# Patient Record
Sex: MALE | Race: ASIAN | NOT HISPANIC OR LATINO | URBAN - METROPOLITAN AREA
[De-identification: names, ages, dates, MRNs, and addresses within clinical notes are randomized per-mention and may not be internally consistent; named-entity substitution may affect disease eponyms.]

---

## 2022-03-25 RX ORDER — LOSARTAN POTASSIUM 100 MG/1
1 TABLET, FILM COATED ORAL
Qty: 0 | Refills: 0 | DISCHARGE

## 2022-03-25 RX ORDER — ERGOCALCIFEROL 1.25 MG/1
1 CAPSULE ORAL
Qty: 0 | Refills: 0 | DISCHARGE

## 2022-03-25 RX ORDER — CHLORHEXIDINE GLUCONATE 213 G/1000ML
1 SOLUTION TOPICAL ONCE
Refills: 0 | Status: DISCONTINUED | OUTPATIENT
Start: 2022-04-27 | End: 2022-05-11

## 2022-03-25 NOTE — H&P ADULT - HISTORY OF PRESENT ILLNESS
Cardiologist: Dr. Phyllis Moy  Escort:  Pharmacy: [facility] [phone]  COVID:    66 yo ___ speaking M, former smoker, with a PMH of HTN, HLD. Type II DM, AAA s/p EVAR who presented to outpatient cardiologist Dr. Phyllis Moy with complaints of a substernal, non-radiating intermittent chest pressure on exertion of 2 city blocks, relieved with rest over the past few weeks. Denies SOB, dizziness, diaphoresis, palpitations, orthopnea/PND, abdominal pain, N/V/D, urinary sx, BRBPR, hematuria, melena, LE swelling, recent travel/sick contacts/illness. NST 12/6/21: moderate perfusion abnormality of moderate intensity in the inferior region which was reversible with rest, LVEF 64%, normal wall motion. Echocardiogram 11/1/21: LVEF 60%, grade I DD, non significant valvular dz. In light of patient's risk factors, CCS angina equivalent class III sx and abnormal NST, patient is referred for cardiac catheterization with possible intervention if clinically indicated.  Cardiologist: Dr. Phyllis Moy  Escort:  Pharmacy: [facility] [phone]  COVID:    *Meds verified w/ patient's pharmacy 3/25/22*    66 yo ___ speaking M, former smoker, with a PMH of HTN, HLD. Type II DM, AAA s/p EVAR who presented to outpatient cardiologist Dr. Phyllis Moy with complaints of a substernal, non-radiating intermittent chest pressure on exertion of 2 city blocks, relieved with rest over the past few weeks. Denies SOB, dizziness, diaphoresis, palpitations, orthopnea/PND, abdominal pain, N/V/D, urinary sx, BRBPR, hematuria, melena, LE swelling, recent travel/sick contacts/illness. NST 12/6/21: moderate perfusion abnormality of moderate intensity in the inferior region which was reversible with rest, LVEF 64%, normal wall motion. Echocardiogram 11/1/21: LVEF 60%, grade I DD, non significant valvular dz. In light of patient's risk factors, CCS angina equivalent class III sx and abnormal NST, patient is referred for cardiac catheterization with possible intervention if clinically indicated.

## 2022-04-19 VITALS
SYSTOLIC BLOOD PRESSURE: 132 MMHG | OXYGEN SATURATION: 99 % | RESPIRATION RATE: 16 BRPM | HEIGHT: 68.9 IN | HEART RATE: 74 BPM | WEIGHT: 147.05 LBS | TEMPERATURE: 98 F | DIASTOLIC BLOOD PRESSURE: 83 MMHG

## 2022-04-19 NOTE — H&P ADULT - NSHPLABSRESULTS_GEN_ALL_CORE
LABS:               16.6   6.71  )-----------( 135      ( 27 Apr 2022 08:34 )             49.3       04-27    139  |  103  |  33<H>  ----------------------------<  117<H>  4.3   |  21<L>  |  1.45<H>    Ca    9.2      27 Apr 2022 08:34    TPro  7.7  /  Alb  4.6  /  TBili  0.4  /  DBili  x   /  AST  19  /  ALT  16  /  AlkPhos  102  04-27      PT/INR - ( 27 Apr 2022 08:34 )   PT: 11.0 sec;   INR: 0.93          PTT - ( 27 Apr 2022 08:34 )  PTT:36.7 sec    CARDIAC MARKERS ( 27 Apr 2022 08:34 )  x     / x     / 206 U/L / x     / 2.1 ng/mL        EKG: NSR 74bpm, no acute ischemia

## 2022-04-19 NOTE — H&P ADULT - NSICDXPASTMEDICALHX_GEN_ALL_CORE_FT
PAST MEDICAL HISTORY:  DM (diabetes mellitus)     History of abdominal aortic aneurysm (AAA)     Hyperlipidemia     Hypertension

## 2022-04-19 NOTE — H&P ADULT - HISTORY OF PRESENT ILLNESS
Cardiologist: Dr. Phyllis Moy  Escort:  Pharmacy: [facility] [phone]  COVID:    *Meds verified w/ patient's pharmacy 3/25/22*    68 yo ___ speaking M, former smoker, w/ PMH of HTN, HLD. DM-II and AAA s/p EVAR, initially presented to outpt cardiologist Dr. Phylils Moy c/o substernal, non-radiating intermittent chest pressure on exertion of 2 city blocks, relieved with rest over the past few weeks. He denies SOB, dizziness, diaphoresis, palpitations, orthopnea/PND, abdominal pain, N/V/D, urinary sx, BRBPR, hematuria, melena, LE swelling, recent travel/sick contacts/illness.    NST 12/6/21: moderate perfusion abnormality of moderate intensity in the inferior region which was reversible with rest, LVEF 64%, normal wall motion. Echo 11/1/21: LVEF 60%, grade I DD, non significant valvular dz.    In light of patient's risk factors, CCS angina equivalent class III sx and abnormal NST, patient is referred for cardiac catheterization with possible intervention if clinically indicated.  Cardiologist: Dr. Phyllis Moy  Pharmacy: [facility]; med rec via [employer] note (pt does not know names/ no list)  COVID: negative 4/22 (in chart)    68 y/o Cantonese-speaking M, former smoker, w/ PMH of HTN, HLD. DM-II and AAA s/p EVAR, initially presented to outpt cardiologist Dr. Phyllis Moy c/o substernal, non-radiating intermittent chest pressure on exertion of 2 city blocks, relieved with rest over the past few weeks. He denies SOB, dizziness, diaphoresis, palpitations, orthopnea/PND, abdominal pain, N/V/D, urinary sx, BRBPR, hematuria, melena, LE swelling, recent travel/sick contacts/illness.    NST 12/6/21: moderate perfusion abnormality of moderate intensity in the inferior region which was reversible with rest, LVEF 64%, normal wall motion. Echo 11/1/21: LVEF 60%, grade I DD, non significant valvular dz.    In light of patient's risk factors, CCS angina equivalent class III sx and abnormal NST, patient is referred for cardiac catheterization with possible intervention if clinically indicated.

## 2022-04-19 NOTE — H&P ADULT - ASSESSMENT
66 y/o Cantonese-speaking M, former smoker, w/ PMH of HTN, HLD. DM-II and AAA s/p EVAR, who presents for cardiac cath light of patient's risk factors, CCS angina equivalent class III sx and abnormal NST.    -ASA III Mallampati III  -suitable candidate for moderate sedation  -DAPT: pt reports taking aspirin 81mg daily, last dose 4/26  -IVF:  -cath consent obtained    Risks & benefits of procedure and alternative therapy have been explained to the patient including but not limited to: allergic reaction, bleeding w/possible need for blood transfusion, infection, renal and vascular compromise, limb damage, arrhythmia, stroke, vessel dissection/perforation, Myocardial infarction, emergent CABG.  66 y/o Cantonese-speaking M, former smoker, w/ PMH of HTN, HLD, DM-II, CKD III (Cr 1.3), and AAA s/p EVAR, who presents for cardiac cath light of patient's risk factors, CCS angina equivalent class III sx and abnormal NST.    -ASA III Mallampati III  -suitable candidate for moderate sedation  -DAPT: pt reports taking aspirin 81mg daily, last dose 4/26  -IVF: NS 250cc bolus followed by 100cc/hr (Cr 1.45, baseline 1.3)  -cath consent obtained via Language Line # [id number]    Risks & benefits of procedure and alternative therapy have been explained to the patient including but not limited to: allergic reaction, bleeding w/possible need for blood transfusion, infection, renal and vascular compromise, limb damage, arrhythmia, stroke, vessel dissection/perforation, Myocardial infarction, emergent CABG.  68 y/o Cantonese-speaking M, former smoker, w/ PMH of HTN, HLD, DM-II, CKD III (Cr 1.3), and AAA s/p EVAR, who presents for cardiac cath light of patient's risk factors, CCS angina equivalent class III sx and abnormal NST.    -ASA III Mallampati III  -suitable candidate for moderate sedation  -DAPT: pt reports taking aspirin 81mg daily, last dose 4/26  -IVF: NS 250cc bolus followed by 75cc/hr (Cr 1.45, baseline 1.3)  -cath consent obtained via Language Line # [id number]    Risks & benefits of procedure and alternative therapy have been explained to the patient including but not limited to: allergic reaction, bleeding w/possible need for blood transfusion, infection, renal and vascular compromise, limb damage, arrhythmia, stroke, vessel dissection/perforation, Myocardial infarction, emergent CABG.

## 2022-04-27 ENCOUNTER — OUTPATIENT (OUTPATIENT)
Dept: OUTPATIENT SERVICES | Facility: HOSPITAL | Age: 67
LOS: 1 days | Discharge: HOME | End: 2022-04-27
Payer: MEDICARE

## 2022-04-27 DIAGNOSIS — Z98.890 OTHER SPECIFIED POSTPROCEDURAL STATES: Chronic | ICD-10-CM

## 2022-04-27 LAB
A1C WITH ESTIMATED AVERAGE GLUCOSE RESULT: 6 % — HIGH (ref 4–5.6)
ALBUMIN SERPL ELPH-MCNC: 4.6 G/DL — SIGNIFICANT CHANGE UP (ref 3.3–5)
ALP SERPL-CCNC: 102 U/L — SIGNIFICANT CHANGE UP (ref 40–120)
ALT FLD-CCNC: 16 U/L — SIGNIFICANT CHANGE UP (ref 10–45)
ANION GAP SERPL CALC-SCNC: 10 MMOL/L — SIGNIFICANT CHANGE UP (ref 5–17)
ANION GAP SERPL CALC-SCNC: 15 MMOL/L — SIGNIFICANT CHANGE UP (ref 5–17)
APTT BLD: 36.7 SEC — HIGH (ref 27.5–35.5)
AST SERPL-CCNC: 19 U/L — SIGNIFICANT CHANGE UP (ref 10–40)
BASOPHILS # BLD AUTO: 0.03 K/UL — SIGNIFICANT CHANGE UP (ref 0–0.2)
BASOPHILS NFR BLD AUTO: 0.4 % — SIGNIFICANT CHANGE UP (ref 0–2)
BILIRUB SERPL-MCNC: 0.4 MG/DL — SIGNIFICANT CHANGE UP (ref 0.2–1.2)
BUN SERPL-MCNC: 30 MG/DL — HIGH (ref 7–23)
BUN SERPL-MCNC: 33 MG/DL — HIGH (ref 7–23)
CALCIUM SERPL-MCNC: 8.6 MG/DL — SIGNIFICANT CHANGE UP (ref 8.4–10.5)
CALCIUM SERPL-MCNC: 9.2 MG/DL — SIGNIFICANT CHANGE UP (ref 8.4–10.5)
CHLORIDE SERPL-SCNC: 103 MMOL/L — SIGNIFICANT CHANGE UP (ref 96–108)
CHLORIDE SERPL-SCNC: 107 MMOL/L — SIGNIFICANT CHANGE UP (ref 96–108)
CHOLEST SERPL-MCNC: 180 MG/DL — SIGNIFICANT CHANGE UP
CK MB CFR SERPL CALC: 2.1 NG/ML — SIGNIFICANT CHANGE UP (ref 0–6.7)
CK SERPL-CCNC: 206 U/L — HIGH (ref 30–200)
CO2 SERPL-SCNC: 21 MMOL/L — LOW (ref 22–31)
CO2 SERPL-SCNC: 24 MMOL/L — SIGNIFICANT CHANGE UP (ref 22–31)
CREAT SERPL-MCNC: 1.27 MG/DL — SIGNIFICANT CHANGE UP (ref 0.5–1.3)
CREAT SERPL-MCNC: 1.45 MG/DL — HIGH (ref 0.5–1.3)
EGFR: 53 ML/MIN/1.73M2 — LOW
EGFR: 62 ML/MIN/1.73M2 — SIGNIFICANT CHANGE UP
EOSINOPHIL # BLD AUTO: 0.11 K/UL — SIGNIFICANT CHANGE UP (ref 0–0.5)
EOSINOPHIL NFR BLD AUTO: 1.6 % — SIGNIFICANT CHANGE UP (ref 0–6)
ESTIMATED AVERAGE GLUCOSE: 126 MG/DL — HIGH (ref 68–114)
GLUCOSE BLDC GLUCOMTR-MCNC: 97 MG/DL — SIGNIFICANT CHANGE UP (ref 70–99)
GLUCOSE SERPL-MCNC: 117 MG/DL — HIGH (ref 70–99)
GLUCOSE SERPL-MCNC: 221 MG/DL — HIGH (ref 70–99)
HCT VFR BLD CALC: 42.8 % — SIGNIFICANT CHANGE UP (ref 39–50)
HCT VFR BLD CALC: 49.3 % — SIGNIFICANT CHANGE UP (ref 39–50)
HDLC SERPL-MCNC: 56 MG/DL — SIGNIFICANT CHANGE UP
HGB BLD-MCNC: 14.6 G/DL — SIGNIFICANT CHANGE UP (ref 13–17)
HGB BLD-MCNC: 16.6 G/DL — SIGNIFICANT CHANGE UP (ref 13–17)
IMM GRANULOCYTES NFR BLD AUTO: 0.4 % — SIGNIFICANT CHANGE UP (ref 0–1.5)
INR BLD: 0.93 — SIGNIFICANT CHANGE UP (ref 0.88–1.16)
LIPID PNL WITH DIRECT LDL SERPL: 97 MG/DL — SIGNIFICANT CHANGE UP
LYMPHOCYTES # BLD AUTO: 1.84 K/UL — SIGNIFICANT CHANGE UP (ref 1–3.3)
LYMPHOCYTES # BLD AUTO: 27.4 % — SIGNIFICANT CHANGE UP (ref 13–44)
MAGNESIUM SERPL-MCNC: 2.1 MG/DL — SIGNIFICANT CHANGE UP (ref 1.6–2.6)
MCHC RBC-ENTMCNC: 31.3 PG — SIGNIFICANT CHANGE UP (ref 27–34)
MCHC RBC-ENTMCNC: 32 PG — SIGNIFICANT CHANGE UP (ref 27–34)
MCHC RBC-ENTMCNC: 33.7 GM/DL — SIGNIFICANT CHANGE UP (ref 32–36)
MCHC RBC-ENTMCNC: 34.1 GM/DL — SIGNIFICANT CHANGE UP (ref 32–36)
MCV RBC AUTO: 91.8 FL — SIGNIFICANT CHANGE UP (ref 80–100)
MCV RBC AUTO: 95 FL — SIGNIFICANT CHANGE UP (ref 80–100)
MONOCYTES # BLD AUTO: 0.62 K/UL — SIGNIFICANT CHANGE UP (ref 0–0.9)
MONOCYTES NFR BLD AUTO: 9.2 % — SIGNIFICANT CHANGE UP (ref 2–14)
NEUTROPHILS # BLD AUTO: 4.08 K/UL — SIGNIFICANT CHANGE UP (ref 1.8–7.4)
NEUTROPHILS NFR BLD AUTO: 61 % — SIGNIFICANT CHANGE UP (ref 43–77)
NON HDL CHOLESTEROL: 124 MG/DL — SIGNIFICANT CHANGE UP
NRBC # BLD: 0 /100 WBCS — SIGNIFICANT CHANGE UP (ref 0–0)
NRBC # BLD: 0 /100 WBCS — SIGNIFICANT CHANGE UP (ref 0–0)
PLATELET # BLD AUTO: 111 K/UL — LOW (ref 150–400)
PLATELET # BLD AUTO: 135 K/UL — LOW (ref 150–400)
POTASSIUM SERPL-MCNC: 3.8 MMOL/L — SIGNIFICANT CHANGE UP (ref 3.5–5.3)
POTASSIUM SERPL-MCNC: 4.3 MMOL/L — SIGNIFICANT CHANGE UP (ref 3.5–5.3)
POTASSIUM SERPL-SCNC: 3.8 MMOL/L — SIGNIFICANT CHANGE UP (ref 3.5–5.3)
POTASSIUM SERPL-SCNC: 4.3 MMOL/L — SIGNIFICANT CHANGE UP (ref 3.5–5.3)
PROT SERPL-MCNC: 7.7 G/DL — SIGNIFICANT CHANGE UP (ref 6–8.3)
PROTHROM AB SERPL-ACNC: 11 SEC — SIGNIFICANT CHANGE UP (ref 10.5–13.4)
RBC # BLD: 4.66 M/UL — SIGNIFICANT CHANGE UP (ref 4.2–5.8)
RBC # BLD: 5.19 M/UL — SIGNIFICANT CHANGE UP (ref 4.2–5.8)
RBC # FLD: 13.5 % — SIGNIFICANT CHANGE UP (ref 10.3–14.5)
RBC # FLD: 13.5 % — SIGNIFICANT CHANGE UP (ref 10.3–14.5)
SODIUM SERPL-SCNC: 139 MMOL/L — SIGNIFICANT CHANGE UP (ref 135–145)
SODIUM SERPL-SCNC: 141 MMOL/L — SIGNIFICANT CHANGE UP (ref 135–145)
TRIGL SERPL-MCNC: 137 MG/DL — SIGNIFICANT CHANGE UP
WBC # BLD: 4.3 K/UL — SIGNIFICANT CHANGE UP (ref 3.8–10.5)
WBC # BLD: 6.71 K/UL — SIGNIFICANT CHANGE UP (ref 3.8–10.5)
WBC # FLD AUTO: 4.3 K/UL — SIGNIFICANT CHANGE UP (ref 3.8–10.5)
WBC # FLD AUTO: 6.71 K/UL — SIGNIFICANT CHANGE UP (ref 3.8–10.5)

## 2022-04-27 PROCEDURE — 93010 ELECTROCARDIOGRAM REPORT: CPT

## 2022-04-27 PROCEDURE — C1725: CPT

## 2022-04-27 PROCEDURE — 83735 ASSAY OF MAGNESIUM: CPT

## 2022-04-27 PROCEDURE — 80061 LIPID PANEL: CPT

## 2022-04-27 PROCEDURE — 85025 COMPLETE CBC W/AUTO DIFF WBC: CPT

## 2022-04-27 PROCEDURE — 85610 PROTHROMBIN TIME: CPT

## 2022-04-27 PROCEDURE — 99152 MOD SED SAME PHYS/QHP 5/>YRS: CPT

## 2022-04-27 PROCEDURE — 92978 ENDOLUMINL IVUS OCT C 1ST: CPT | Mod: 26,RC

## 2022-04-27 PROCEDURE — 93458 L HRT ARTERY/VENTRICLE ANGIO: CPT | Mod: XU

## 2022-04-27 PROCEDURE — C1887: CPT

## 2022-04-27 PROCEDURE — 82550 ASSAY OF CK (CPK): CPT

## 2022-04-27 PROCEDURE — C1894: CPT

## 2022-04-27 PROCEDURE — 85027 COMPLETE CBC AUTOMATED: CPT

## 2022-04-27 PROCEDURE — 85730 THROMBOPLASTIN TIME PARTIAL: CPT

## 2022-04-27 PROCEDURE — 93458 L HRT ARTERY/VENTRICLE ANGIO: CPT | Mod: 26,XU

## 2022-04-27 PROCEDURE — C1769: CPT

## 2022-04-27 PROCEDURE — C1874: CPT

## 2022-04-27 PROCEDURE — 99153 MOD SED SAME PHYS/QHP EA: CPT

## 2022-04-27 PROCEDURE — 80048 BASIC METABOLIC PNL TOTAL CA: CPT

## 2022-04-27 PROCEDURE — 82553 CREATINE MB FRACTION: CPT

## 2022-04-27 PROCEDURE — 93005 ELECTROCARDIOGRAM TRACING: CPT

## 2022-04-27 PROCEDURE — C9600: CPT | Mod: RC

## 2022-04-27 PROCEDURE — 80053 COMPREHEN METABOLIC PANEL: CPT

## 2022-04-27 PROCEDURE — 92928 PRQ TCAT PLMT NTRAC ST 1 LES: CPT | Mod: RC

## 2022-04-27 PROCEDURE — 83036 HEMOGLOBIN GLYCOSYLATED A1C: CPT

## 2022-04-27 PROCEDURE — 93010 ELECTROCARDIOGRAM REPORT: CPT | Mod: 77

## 2022-04-27 PROCEDURE — 92978 ENDOLUMINL IVUS OCT C 1ST: CPT | Mod: RC

## 2022-04-27 PROCEDURE — 82962 GLUCOSE BLOOD TEST: CPT

## 2022-04-27 PROCEDURE — C1753: CPT

## 2022-04-27 RX ORDER — FLUTICASONE PROPIONATE AND SALMETEROL 50; 250 UG/1; UG/1
1 POWDER ORAL; RESPIRATORY (INHALATION)
Qty: 0 | Refills: 0 | DISCHARGE

## 2022-04-27 RX ORDER — DONEPEZIL HYDROCHLORIDE 10 MG/1
1 TABLET, FILM COATED ORAL
Qty: 0 | Refills: 0 | DISCHARGE

## 2022-04-27 RX ORDER — NEBIVOLOL HYDROCHLORIDE 5 MG/1
1 TABLET ORAL
Qty: 0 | Refills: 0 | DISCHARGE

## 2022-04-27 RX ORDER — LABETALOL HCL 100 MG
1 TABLET ORAL
Qty: 0 | Refills: 0 | DISCHARGE

## 2022-04-27 RX ORDER — DAPAGLIFLOZIN 10 MG/1
1 TABLET, FILM COATED ORAL
Qty: 0 | Refills: 0 | DISCHARGE

## 2022-04-27 RX ORDER — RANOLAZINE 500 MG/1
1 TABLET, FILM COATED, EXTENDED RELEASE ORAL
Qty: 0 | Refills: 0 | DISCHARGE

## 2022-04-27 RX ORDER — FLUTICASONE FUROATE, UMECLIDINIUM BROMIDE AND VILANTEROL TRIFENATATE 200; 62.5; 25 UG/1; UG/1; UG/1
1 POWDER RESPIRATORY (INHALATION)
Qty: 0 | Refills: 0 | DISCHARGE

## 2022-04-27 RX ORDER — SODIUM CHLORIDE 9 MG/ML
500 INJECTION INTRAMUSCULAR; INTRAVENOUS; SUBCUTANEOUS
Refills: 0 | Status: DISCONTINUED | OUTPATIENT
Start: 2022-04-27 | End: 2022-04-27

## 2022-04-27 RX ORDER — SIMVASTATIN 20 MG/1
1 TABLET, FILM COATED ORAL
Qty: 0 | Refills: 0 | DISCHARGE

## 2022-04-27 RX ORDER — CLOPIDOGREL BISULFATE 75 MG/1
1 TABLET, FILM COATED ORAL
Qty: 30 | Refills: 11
Start: 2022-04-27 | End: 2023-04-21

## 2022-04-27 RX ORDER — ASPIRIN/CALCIUM CARB/MAGNESIUM 324 MG
1 TABLET ORAL
Qty: 0 | Refills: 0 | DISCHARGE

## 2022-04-27 RX ORDER — ASPIRIN/CALCIUM CARB/MAGNESIUM 324 MG
1 TABLET ORAL
Qty: 30 | Refills: 11
Start: 2022-04-27 | End: 2023-04-21

## 2022-04-27 RX ORDER — SODIUM CHLORIDE 9 MG/ML
250 INJECTION INTRAMUSCULAR; INTRAVENOUS; SUBCUTANEOUS ONCE
Refills: 0 | Status: COMPLETED | OUTPATIENT
Start: 2022-04-27 | End: 2022-04-27

## 2022-04-27 RX ORDER — SODIUM CHLORIDE 9 MG/ML
500 INJECTION INTRAMUSCULAR; INTRAVENOUS; SUBCUTANEOUS
Refills: 0 | Status: DISCONTINUED | OUTPATIENT
Start: 2022-04-27 | End: 2022-05-11

## 2022-04-27 RX ORDER — TAMSULOSIN HYDROCHLORIDE 0.4 MG/1
1 CAPSULE ORAL
Qty: 0 | Refills: 0 | DISCHARGE

## 2022-04-27 RX ORDER — PREGABALIN 225 MG/1
1 CAPSULE ORAL
Qty: 0 | Refills: 0 | DISCHARGE

## 2022-04-27 RX ORDER — CLOPIDOGREL BISULFATE 75 MG/1
600 TABLET, FILM COATED ORAL ONCE
Refills: 0 | Status: COMPLETED | OUTPATIENT
Start: 2022-04-27 | End: 2022-04-27

## 2022-04-27 RX ORDER — ASPIRIN/CALCIUM CARB/MAGNESIUM 324 MG
81 TABLET ORAL ONCE
Refills: 0 | Status: COMPLETED | OUTPATIENT
Start: 2022-04-27 | End: 2022-04-27

## 2022-04-27 RX ORDER — ICOSAPENT ETHYL 500 MG/1
2 CAPSULE, LIQUID FILLED ORAL
Qty: 0 | Refills: 0 | DISCHARGE

## 2022-04-27 RX ADMIN — CLOPIDOGREL BISULFATE 600 MILLIGRAM(S): 75 TABLET, FILM COATED ORAL at 09:50

## 2022-04-27 RX ADMIN — SODIUM CHLORIDE 75 MILLILITER(S): 9 INJECTION INTRAMUSCULAR; INTRAVENOUS; SUBCUTANEOUS at 09:01

## 2022-04-27 RX ADMIN — SODIUM CHLORIDE 500 MILLILITER(S): 9 INJECTION INTRAMUSCULAR; INTRAVENOUS; SUBCUTANEOUS at 09:00

## 2022-04-27 RX ADMIN — Medication 81 MILLIGRAM(S): at 09:50

## 2022-04-27 RX ADMIN — SODIUM CHLORIDE 200 MILLILITER(S): 9 INJECTION INTRAMUSCULAR; INTRAVENOUS; SUBCUTANEOUS at 13:10

## 2022-04-27 NOTE — PROGRESS NOTE ADULT - SUBJECTIVE AND OBJECTIVE BOX
Interventional Cardiology PA Post PCI SDA Discharge Note      Patient without complaints. Ambulated and voided without difficulties    Afebrile, VSS    Ext:      		Right           Radial : no   hematoma,  no   bleeding, dressing; C/D/I      Pulses:    intact RAD to baseline     A/P: 68 y/o Cantonese-speaking M, former smoker, w/ PMH of HTN, HLD, DM-II, CKD III (Cr 1.3), and AAA s/p EVAR, who presents for cardiac cath light of patient's risk factors, CCS angina equivalent class III sx and abnormal NST.      1.	Follow-up with PMD/Cardiologist ___________ in 72 hours.  2.                 Post procedure labs/EKG reviewed and stable.    3.                 Pt given instructions on importance of taking antiplatelet medication.    4. 	Stable for discharge as per attending . _________ after bed rest, pt voids, groin/wrist stable and 30 minutes of ambulation.  5.                 Prescriptions for Aspirin/Plavix/Brilinta/Effient e-prescribed and submitted to patient's pharmacy.    6.                 Patient will continue statin (Name and dose).  No Statin Prescribed due to ____________.  7.	Discharge forms signed and copies in chart     Interventional Cardiology PA Post PCI SDA Discharge Note      Patient without complaints. Ambulated and voided without difficulties    Afebrile, VSS    Ext:      		Right           Radial : no   hematoma,  no   bleeding, dressing; C/D/I      Pulses:    intact RAD to baseline     A/P: 68 y/o Cantonese-speaking M, former smoker, w/ PMH of HTN, HLD, DM-II, CKD III (Cr 1.3), and AAA s/p EVAR, who presents for cardiac cath light of patient's risk factors, CCS angina equivalent class III sx and abnormal NST.   s/p cardiac cath 4/27/22 LM 30% aneurysmal (IVUS 6-8), mLAD 30%, LCx mild disease, pRCA 80%, s/p DESx1, EDP 4 R TR      1.	Follow-up with PMD/Cardiologist Chinmay Fisher in 72 hours.  2.                 Post procedure labs/EKG reviewed and stable.    3.                 Pt given instructions on importance of taking antiplatelet medication.    4. 	Stable for discharge as per attending Dr. Chand after bed rest, pt voids, groin/wrist stable and 30 minutes of ambulation.  5.                 Prescriptions for Aspirin/Plavix/  e-prescribed and submitted to patient's pharmacy.    6.                 Patient will continue statin   7.	Discharge forms signed and copies in chart

## 2022-04-29 DIAGNOSIS — I25.110 ATHEROSCLEROTIC HEART DISEASE OF NATIVE CORONARY ARTERY WITH UNSTABLE ANGINA PECTORIS: ICD-10-CM

## 2022-04-29 DIAGNOSIS — R94.39 ABNORMAL RESULT OF OTHER CARDIOVASCULAR FUNCTION STUDY: ICD-10-CM
